# Patient Record
Sex: MALE | URBAN - METROPOLITAN AREA
[De-identification: names, ages, dates, MRNs, and addresses within clinical notes are randomized per-mention and may not be internally consistent; named-entity substitution may affect disease eponyms.]

---

## 2022-12-29 DIAGNOSIS — M79.642 PAIN OF LEFT HAND: Primary | ICD-10-CM

## 2023-01-04 ENCOUNTER — APPOINTMENT (OUTPATIENT)
Dept: RADIOLOGY | Facility: HOSPITAL | Age: 78
End: 2023-01-04
Attending: ORTHOPAEDIC SURGERY
Payer: OTHER GOVERNMENT

## 2023-01-04 ENCOUNTER — OFFICE VISIT (OUTPATIENT)
Dept: ORTHOPEDICS | Facility: CLINIC | Age: 78
End: 2023-01-04
Payer: OTHER GOVERNMENT

## 2023-01-04 DIAGNOSIS — M79.642 PAIN OF LEFT HAND: ICD-10-CM

## 2023-01-04 DIAGNOSIS — M79.642 PAIN OF LEFT HAND: Primary | ICD-10-CM

## 2023-01-04 PROCEDURE — 73130 XR HAND COMPLETE 3 VIEW LEFT: ICD-10-PCS | Mod: 26,LT,, | Performed by: RADIOLOGY

## 2023-01-04 PROCEDURE — 99203 OFFICE O/P NEW LOW 30 MIN: CPT | Mod: S$PBB,,, | Performed by: ORTHOPAEDIC SURGERY

## 2023-01-04 PROCEDURE — 99999 PR PBB SHADOW E&M-EST. PATIENT-LVL II: ICD-10-PCS | Mod: PBBFAC,,, | Performed by: ORTHOPAEDIC SURGERY

## 2023-01-04 PROCEDURE — 99999 PR PBB SHADOW E&M-EST. PATIENT-LVL II: CPT | Mod: PBBFAC,,, | Performed by: ORTHOPAEDIC SURGERY

## 2023-01-04 PROCEDURE — 99212 OFFICE O/P EST SF 10 MIN: CPT | Mod: PBBFAC,PN | Performed by: ORTHOPAEDIC SURGERY

## 2023-01-04 PROCEDURE — 73130 X-RAY EXAM OF HAND: CPT | Mod: 26,LT,, | Performed by: RADIOLOGY

## 2023-01-04 PROCEDURE — 73130 X-RAY EXAM OF HAND: CPT | Mod: TC,FY,PN,LT

## 2023-01-04 PROCEDURE — 99203 PR OFFICE/OUTPT VISIT, NEW, LEVL III, 30-44 MIN: ICD-10-PCS | Mod: S$PBB,,, | Performed by: ORTHOPAEDIC SURGERY

## 2023-01-04 NOTE — PROGRESS NOTES
Assessment: 77 y.o. male with left volar hand mass    I explained my diagnostic impression and the reasoning behind it in detail, using layman's terms.     Plan:   - referred to hand surgery  - Return to clinic p.r.n.    All questions were answered in detail. The patient is in full agreement with the treatment plan and will proceed accordingly.    Chief Complaint   Patient presents with    Left Hand - Pain       Initial visit (1/4/23): Brayan Dao Sr. is a 77 y.o. male who presents today complaining of Pain of the Left Hand    This is a 77-year-old male with a mass to the left volar hand that has been present since he was a child.  It recently has started becoming larger and is interfering with function due to its size.  It is minimally painful.  It has increased slowly in size.  No new trauma.  Denies numbness and tingling      This patient was seen in consultation at the request of [facility]    This is the extent of the patient's complaints at this time.          Review of patient's allergies indicates:  Not on File    No current outpatient medications on file.    Physical Exam:   Vitals:    01/04/23 1029   PainSc: 0-No pain       General:  Patient is alert, awake and oriented to time, place and person. Mood and affect are appropriate.  Patient does not appear to be in any distress, denies any constitutional symptoms and appears stated age.   HEENT:  Pupils are equal and round, sclera are not injected. External examination of ears and nose reveals no abnormalities. Cranial nerves II-X are grossly intact  skin:  no rashes, abrasions or open wounds on the affected extremity   Resp: No respiratory distress or audible wheezing   CV: 2+  pulses, all extremities warm and well perfused     Left Hand   3 cm mass centered over the distal 2nd metacarpal on the volar aspect of the hand   It is firm and slightly mobile, possibly a ganglion cyst  No skin changes  Limited flexion of the 2nd and 3rd digits due  to the mass effect of this mass  LTSI m/u/r  2+ RP  + EPL, IO, FDS, FDP    Imaging:  Three views of the left hand show soft tissue swelling in the area of the mass.  There is no visible soft tissue or bony mass.  There are no changes to the bone by the mass.  No significant degenerative changes or fracture    I personally reviewed and interpreted the patient's imaging obtained today in clinic       A note notifying [facility] of my findings was sent via the electronic medical record     This note was created by combination of typed  and M-Modal dictation. Transcription and phonetic errors may be present.  If there are any questions, please contact me.    History reviewed. No pertinent past medical history.    There are no problems to display for this patient.      History reviewed. No pertinent surgical history.    History reviewed. No pertinent family history.

## 2023-01-27 ENCOUNTER — TELEPHONE (OUTPATIENT)
Dept: ORTHOPEDICS | Facility: CLINIC | Age: 78
End: 2023-01-27
Payer: OTHER GOVERNMENT

## 2023-01-27 NOTE — TELEPHONE ENCOUNTER
Spoke c pt. Spoke c pt. Confirmed appt location & time c Dr. Rodriguez 2/2/23. Pt requested follow up reminder of appt next week the day before. Pt expressed understanding & was thankful.

## 2023-02-01 ENCOUNTER — TELEPHONE (OUTPATIENT)
Dept: ORTHOPEDICS | Facility: CLINIC | Age: 78
End: 2023-02-01
Payer: OTHER GOVERNMENT